# Patient Record
Sex: MALE | Race: WHITE | NOT HISPANIC OR LATINO
[De-identification: names, ages, dates, MRNs, and addresses within clinical notes are randomized per-mention and may not be internally consistent; named-entity substitution may affect disease eponyms.]

---

## 2019-01-21 ENCOUNTER — TRANSCRIPTION ENCOUNTER (OUTPATIENT)
Age: 74
End: 2019-01-21

## 2019-04-12 ENCOUNTER — APPOINTMENT (OUTPATIENT)
Dept: UROLOGY | Facility: CLINIC | Age: 74
End: 2019-04-12
Payer: MEDICARE

## 2019-04-12 ENCOUNTER — RECORD ABSTRACTING (OUTPATIENT)
Age: 74
End: 2019-04-12

## 2019-04-12 VITALS — HEART RATE: 50 BPM

## 2019-04-12 VITALS — SYSTOLIC BLOOD PRESSURE: 100 MMHG | DIASTOLIC BLOOD PRESSURE: 70 MMHG

## 2019-04-12 VITALS — WEIGHT: 156 LBS | BODY MASS INDEX: 24.48 KG/M2 | HEIGHT: 67 IN

## 2019-04-12 DIAGNOSIS — R97.20 ELEVATED PROSTATE, SPECIFIC ANTIGEN [PSA]: ICD-10-CM

## 2019-04-12 DIAGNOSIS — N40.1 BENIGN PROSTATIC HYPERPLASIA WITH LOWER URINARY TRACT SYMPMS: ICD-10-CM

## 2019-04-12 DIAGNOSIS — Z86.79 PERSONAL HISTORY OF OTHER DISEASES OF THE CIRCULATORY SYSTEM: ICD-10-CM

## 2019-04-12 DIAGNOSIS — N52.9 MALE ERECTILE DYSFUNCTION, UNSPECIFIED: ICD-10-CM

## 2019-04-12 DIAGNOSIS — N48.6 INDURATION PENIS PLASTICA: ICD-10-CM

## 2019-04-12 DIAGNOSIS — Z86.39 PERSONAL HISTORY OF OTHER ENDOCRINE, NUTRITIONAL AND METABOLIC DISEASE: ICD-10-CM

## 2019-04-12 DIAGNOSIS — Z87.898 PERSONAL HISTORY OF OTHER SPECIFIED CONDITIONS: ICD-10-CM

## 2019-04-12 DIAGNOSIS — Z95.5 PRESENCE OF CORONARY ANGIOPLASTY IMPLANT AND GRAFT: ICD-10-CM

## 2019-04-12 DIAGNOSIS — Z80.0 FAMILY HISTORY OF MALIGNANT NEOPLASM OF DIGESTIVE ORGANS: ICD-10-CM

## 2019-04-12 PROBLEM — Z00.00 ENCOUNTER FOR PREVENTIVE HEALTH EXAMINATION: Status: ACTIVE | Noted: 2019-04-12

## 2019-04-12 LAB
BACTERIA: 0
BILIRUB UR QL STRIP: 1
CASTS: 0
CLARITY UR: CLEAR
COLLECTION METHOD: NORMAL
CRYSTALS: 0
DATE COLLECTED: NORMAL
EPITHELIAL CELLS: 0
GLUCOSE UR-MCNC: NORMAL
HCG UR QL: 2 EU/DL
HEMOCCULT SP1 STL QL: NEGATIVE
HGB UR QL STRIP.AUTO: NORMAL
KETONES UR-MCNC: NORMAL
LEUKOCYTE ESTERASE UR QL STRIP: NORMAL
MUCUS: 0
NITRITE UR QL STRIP: NORMAL
PH UR STRIP: 5
PROT UR STRIP-MCNC: NORMAL
QUALITY CONTROL: YES
RBC CASTS # UR COMP ASSIST: 0
SP GR UR STRIP: 1.02
WBC: 0

## 2019-04-12 PROCEDURE — 99214 OFFICE O/P EST MOD 30 MIN: CPT | Mod: 25

## 2019-04-12 PROCEDURE — 81002 URINALYSIS NONAUTO W/O SCOPE: CPT

## 2019-04-12 PROCEDURE — 36415 COLL VENOUS BLD VENIPUNCTURE: CPT | Mod: 59

## 2019-04-12 RX ORDER — ROSUVASTATIN CALCIUM 5 MG/1
5 TABLET, FILM COATED ORAL
Qty: 30 | Refills: 0 | Status: ACTIVE | COMMUNITY
Start: 2018-09-20

## 2019-04-12 RX ORDER — TAMSULOSIN HYDROCHLORIDE 0.4 MG/1
0.4 CAPSULE ORAL
Qty: 30 | Refills: 0 | Status: ACTIVE | COMMUNITY
Start: 2018-06-28

## 2019-04-12 RX ORDER — SILDENAFIL 20 MG/1
20 TABLET ORAL
Refills: 0 | Status: ACTIVE | COMMUNITY

## 2019-04-12 RX ORDER — METOPROLOL SUCCINATE 25 MG/1
25 TABLET, EXTENDED RELEASE ORAL
Qty: 30 | Refills: 0 | Status: ACTIVE | COMMUNITY
Start: 2019-03-15

## 2019-04-12 RX ORDER — CLOPIDOGREL 75 MG/1
75 TABLET, FILM COATED ORAL
Refills: 0 | Status: ACTIVE | COMMUNITY

## 2019-04-12 RX ORDER — RIVAROXABAN 20 MG/1
20 TABLET, FILM COATED ORAL
Qty: 30 | Refills: 0 | Status: ACTIVE | COMMUNITY
Start: 2018-08-07

## 2019-04-12 RX ORDER — MINOXIDIL 2 G/100ML
2 SOLUTION TOPICAL
Refills: 0 | Status: ACTIVE | COMMUNITY

## 2019-04-12 RX ORDER — DILTIAZEM HYDROCHLORIDE 120 MG/1
120 CAPSULE, EXTENDED RELEASE ORAL
Qty: 30 | Refills: 0 | Status: ACTIVE | COMMUNITY
Start: 2018-09-20

## 2019-04-12 NOTE — ASSESSMENT
[FreeTextEntry1] : Feliciano Boyce is a 74-year-old gentleman with BPH/LUTS and prostatic nodularity that has prompted biopsy on 4 occasions. Patient's most recent blood testing included a 4K Score only 5%. On data to date but suggest the absence of any evidence of prostate cancer. Continued management of systems BPH with patulous and is advised. It was appreciated that he has mild penile curvature is not causing him any difficult at this time. Followup in 6 months is planned.

## 2019-04-12 NOTE — HISTORY OF PRESENT ILLNESS
[FreeTextEntry1] :  (Last seen 11/20/18)\par        .\par        Feliciano Boyce is a 74 -year-old gentleman with long-standing irritative and obstructive voiding symptoms secondary to modest prostatic enlargement and associated with labile PSA values. The patient has already undergone 4 prostate biopsies prompted by a combination of prostatic nodularity and PSA elevations. All biopsies have proven to be benign. The patient's most recent PSA from 11/20/19 was 4.74 with a 4K Score of 5%.\par \par Mr. Boyce reports he had coronary artery stenting  03/21/19 (single vessel) and was started on metoprolol and Plavix in addition to Xarelto.\par \par        .\par        PERTINENT UROLOGIC HISTORY:\par         09/03/97: PSA 0.8 NG/ML; free PSA 18%\par         09/06/01: PSA 1.3\par         One 1/29/01: PROSTATE BIOPSY (-)\par         08/21/03: PSA 1.19\par         07/28/04: PROSTATE BIOPSY (-) (performed by )\par         01/31/05: PSA 3.7; free PSA 10%; \par         02/21/05: PROSTATE BIOPSY(-)\par         03/11/11: PSA 4.17; free PSA of 11%\par         05/10/11: PSA 4.05; free PSA 9%\par         06/06/11: PROSTATE BIOPSY (-)\par         12/30/13: Pelvic ultrasonography-prostate estimated to be 49.0 cc. PPSA 5.86 NG/ML\par         11/14/14: CYSTOSCOPY: 4 cm prostate with large ball valving median lobe. Prostate the source of chronic microscopic hematuria; 2+ bladder trabeculation; abnormal CUF; PVR 15 cc\par         12/29/14: PSA 3.82; free PSA 18%\par        03/04/16: PSA 5.30; free PSA of 13% (new lab); repeat in 3 months advised\par        05/26/16: PSA 5.69; free PSA 15%\par        01/23/17: PSA 5.13; free PSA 12%; 4K Score was 36% \par        01/23/17: PELVIC ULTRASONOGRAPHY; prostatic volume 55.9 cc.; PPSA 6.71 NG/ML; \par        02/21/17: Prostatic MRI negative for obvious disease."enlarged prostate gland with central prostatic hypertrophy. Lesion of indeterminate suspicion (PIRADS 3) in the right central gland... Nonspecific 0.6 cm low T2 signal nodule in the proximal right seminal vesicle without associated enhancement are restricted diffusion. Overall score 3 equal clinically significant disease is equivocal". Given prior biopsy history this MRI was felt to be benign\par        05/19/17: PSA 4.40; free PSA 15%\par        11/27/17: PSA 4.52; free PSA 15%\par        05/07/18: PSA 5.55; free PSA 16%\par \par 11/20/18: 4K Score 5%; PSA 4.74; free PSA 14%\par \par CURRENT UROLOGIC REVIEW OF SYSTEMS:\par \par Incontinence Assessed?.  YES\par \par Nocturia:  (+) 0-3  x/night AVERAGES 3 X/NIGHT (DIRNKING BEFORE BED)\par Frequency: (+) 6-7    x/day    BUT CAN HOLD 6 HOURS\par Dysuria: (-)\par Urgency:  (-) NO CAN IN THE CAR\par Hesitancy:  (-)\par Intermittency:  (-)\par Sensation of Incomplete Voiding :  (-)\par Double voiding :  (-)\par Stress incontinence:  (-)\par Urge incontinence:  (-)\par Use of absorbent pads:  (-)\par Terminal dribbling:  (-)\par Status of stream: "NOT BAD, A LITTLE WEAK"\par Venereal disease:  (-)\par Kidney stones:  (-)\par UTIs:  (+) PROSTATITIS\par Prior urologic surgery:  (+) MULTIPLE  PBx\par Hematuria:  (-)\par Abdominal pressure:  (-)\par Back pain:  (-)\par Sexual Status: ERECTIONS ARE GOOD, ESPECIALLY WITH SILDENAFIL;  RECENTLY HAS NOTICED PENILE CURVATURE( PEYRONIES).  ERECTIONS ARE NOT PAINFUL;  NOT SEVERE NORMAL EJACULATE \par Gout:  (-)\par Renal problems:  (-)\par Family History of Urologic Problems:  (-)\par International Prostate Symptom Score (IPSS): 9  (Moderate 8-19)\par Satisfaction Index: 2 (mostly satisfied)

## 2019-04-12 NOTE — REVIEW OF SYSTEMS
[Feeling Tired] : feeling tired [Nosebleeds] : nosebleeds [see HPI] : see HPI [Negative] : Endocrine [Fever] : no fever [Chills] : no chills [Recent Weight Gain (___ Lbs)] : no recent weight gain [Feeling Poorly] : not feeling poorly [Visual Disturbances] : no visual disturbances [Recent Weight Loss (___ Lbs)] : no recent weight loss [Loss Of Hearing] : no hearing loss [Diarrhea] : no diarrhea [Constipation] : no constipation [Abdominal Pain] : no abdominal pain [Melena] : no melena [Heartburn] : no heartburn [Suicidal] : not suicidal [Sleep Disturbances] : no sleep disturbances [Anxiety] : no anxiety [Depression] : no depression [Easy Bleeding] : no tendency for easy bleeding

## 2019-04-12 NOTE — PHYSICAL EXAM
[General Appearance - Well Nourished] : well nourished [Normal Appearance] : normal appearance [General Appearance - In No Acute Distress] : no acute distress [Well Groomed] : well groomed [Bowel Sounds] : normal bowel sounds [Abdomen Soft] : soft [Abdomen Tenderness] : non-tender [Abdomen Mass (___ Cm)] : no abdominal mass palpated [Abdomen Hernia] : no hernia was discovered [Costovertebral Angle Tenderness] : no ~M costovertebral angle tenderness [Size (2+)] : size was 2+ [Prostate Hard Area Or Nodule On The Left] : had a palpable nodule [Nl Sphincter Tone] : normal sphincter tone [Base] : base [Nl Perineum] : perineum was normal on inspection [No Lesions] : no lesions [Normal] : normal [Testes] : normal [Epididymis] : was normal [Edema] : no peripheral edema [] : no respiratory distress [Respiration, Rhythm And Depth] : normal respiratory rhythm and effort [Auscultation Breath Sounds / Voice Sounds] : lungs were clear to auscultation bilaterally [Exaggerated Use Of Accessory Muscles For Inspiration] : no accessory muscle use [Lungs Percussion] : the lungs were normal to percussion [Chest Palpation] : palpation of the chest revealed no abnormalities [Affect] : the affect was normal [Oriented To Time, Place, And Person] : oriented to person, place, and time [Mood] : the mood was normal [Not Anxious] : not anxious [No Focal Deficits] : no focal deficits [Normal Station and Gait] : the gait and station were normal for the patient's age [No Palpable Adenopathy] : no palpable adenopathy [Rectal Exam - Prostate] : was not indurated [Prostate Fluctuant] : was not fluctuant [Prostate Tenderness] : was not tender [Rectal Tenderness] : no tenderness [Occult Blood Positive] : exam was negative for occult blood [Mass___cm] : no rectal masses [Discharge] : no discharge [Circumcised] : the penis was uncircumcised [FreeTextEntry1] : atrial fibrillation; no heart murmurs to auscultation

## 2019-11-04 ENCOUNTER — APPOINTMENT (OUTPATIENT)
Dept: UROLOGY | Facility: CLINIC | Age: 74
End: 2019-11-04